# Patient Record
Sex: MALE | Race: BLACK OR AFRICAN AMERICAN | Employment: FULL TIME | URBAN - METROPOLITAN AREA
[De-identification: names, ages, dates, MRNs, and addresses within clinical notes are randomized per-mention and may not be internally consistent; named-entity substitution may affect disease eponyms.]

---

## 2019-01-31 ENCOUNTER — HOSPITAL ENCOUNTER (OUTPATIENT)
Dept: MRI IMAGING | Age: 39
Discharge: HOME | End: 2019-01-31
Attending: PODIATRIST
Payer: OTHER GOVERNMENT

## 2019-01-31 VITALS — WEIGHT: 210 LBS

## 2019-01-31 DIAGNOSIS — M76.821 TIBIALIS POSTERIOR TENDINITIS, RIGHT: ICD-10-CM

## 2019-01-31 PROCEDURE — 73723 MRI JOINT LWR EXTR W/O&W/DYE: CPT

## 2019-01-31 PROCEDURE — 74011250636 HC RX REV CODE- 250/636: Performed by: RADIOLOGY

## 2019-01-31 PROCEDURE — A9575 INJ GADOTERATE MEGLUMI 0.1ML: HCPCS | Performed by: RADIOLOGY

## 2019-01-31 RX ORDER — GADOTERATE MEGLUMINE 376.9 MG/ML
19 INJECTION INTRAVENOUS
Status: COMPLETED | OUTPATIENT
Start: 2019-01-31 | End: 2019-01-31

## 2019-01-31 RX ADMIN — GADOTERATE MEGLUMINE 19 ML: 376.9 INJECTION INTRAVENOUS at 18:35

## 2021-12-28 ENCOUNTER — OFFICE VISIT (OUTPATIENT)
Dept: ORTHOPEDIC SURGERY | Age: 41
End: 2021-12-28
Payer: OTHER GOVERNMENT

## 2021-12-28 VITALS — BODY MASS INDEX: 29.8 KG/M2 | HEIGHT: 72 IN | WEIGHT: 220 LBS

## 2021-12-28 DIAGNOSIS — S63.641A RUPTURE OF RADIAL COLLATERAL LIGAMENT OF THUMB, RIGHT, INITIAL ENCOUNTER: ICD-10-CM

## 2021-12-28 DIAGNOSIS — M79.641 RIGHT HAND PAIN: Primary | ICD-10-CM

## 2021-12-28 PROCEDURE — 99203 OFFICE O/P NEW LOW 30 MIN: CPT | Performed by: ORTHOPAEDIC SURGERY

## 2021-12-28 RX ORDER — DICLOFENAC SODIUM 75 MG/1
TABLET, DELAYED RELEASE ORAL
COMMUNITY
Start: 2021-11-12

## 2021-12-28 RX ORDER — ZOLPIDEM TARTRATE 10 MG/1
TABLET ORAL
COMMUNITY
Start: 2021-12-08

## 2021-12-28 NOTE — PROGRESS NOTES
HPI: Hoang Servin (: 1980) is a 35 y.o. male, patient, here for evaluation of the following chief complaint(s):    Thumb Pain (right hand dominant. right thumb pain for \"a while\" previously seen at another facility 2 years ago where he was diagnosed with trigger thumb. has become worse over time. is becoming more difficult for thumb to unlock on its own when it locks)  Patient is seen today to evaluate his right thumb. The patient has complained of right thumb pain for about the last 2 years. He states it has worsened over time. He also recalls that more recently he felt a pop and increasing pain involving his right thumb. His thumb rests ulnarly deviated at the metacarpal phalangeal joint due to clinical loss of the radial collateral ligament. He denied any numbness or tingling and is seen for further treatment of his right thumb. Vitals:  Ht 6' (1.829 m)   Wt 220 lb (99.8 kg)   BMI 29.84 kg/m²    Body mass index is 29.84 kg/m². No Known Allergies    Current Outpatient Medications   Medication Sig    diclofenac EC (VOLTAREN) 75 mg EC tablet     zolpidem (AMBIEN) 10 mg tablet      No current facility-administered medications for this visit. History reviewed. No pertinent past medical history. History reviewed. No pertinent surgical history. History reviewed. No pertinent family history. Social History     Tobacco Use    Smoking status: Never Smoker    Smokeless tobacco: Never Used   Vaping Use    Vaping Use: Never used   Substance Use Topics    Alcohol use: Not Currently    Drug use: Never        Review of Systems           Physical Exam    The right thumb rests 25 degrees ulnarly deviated the metacarpal phalangeal joint. Stress testing shows angulation increased to 60 degrees and there is no clear evidence of any intact radial collateral ligament tissue. Otherwise no digital clicking or locking and nontender to A1 pulley.     Examination of the right wrist reveals no swelling, edema or warmth, no tenderness to palpation, no pain, normal strength and tone, normal range of motion, no crepitus, normal sensation, no instability or laxity and no known fractures or deformities. Examination of the left wrist reveals no swelling, edema or warmth, no tenderness to palpation, no pain, normal strength and tone, normal range of motion, no crepitus, normal sensation, no instability or laxity and no known fractures or deformities. Examination of the left hand reveals no tenderness to palpation, no pain, normal  strength, normal tone, normal range of motion, no crepitus, no instability or laxity, no known fractures or deformities and normal sensation. Imaging:    XR Results (most recent):  Results from Appointment encounter on 12/28/21    XR HAND RT MIN 3 V    Narrative  AP, lateral bleak x-ray of the right hand show good bone stock and no acute fracture but there is ulnar deviation angulation of the right thumb metacarpal phalangeal joint with soft tissue swelling radially and some radial prominence of the metacarpal head consistent with prior radial collateral ligament injury. ASSESSMENT/PLAN:  Below is the assessment and plan developed based on review of pertinent history, physical exam, labs, studies, and medications. 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 The joint itself did not appear to show arthritic change but there is some prominence radial in the metacarpal head suggesting some component of chronicity. I explained to him that he could undergo reconstruction versus a fusion. He would prefer to trial of the reconstruction and if that were to be less than successful he could in the future consider the fusion.   I reviewed risks that include but not limited to stiffness, pain, nerve or tendon damage, continued instability and the possible need for a later fusion. The current plan is to perform the reconstruction with the Arthrex internal brace. Arrangements can be made for this to be performed on an outpatient basis as soon as possible. 1. Right hand pain  -     XR HAND RT MIN 3 V; Future  2. Rupture of radial collateral ligament of thumb, right, initial encounter      Return for Follow-up 7 to 10 days after surgery. .    An electronic signature was used to authenticate this note.   -- Amy Young MD

## 2021-12-30 DIAGNOSIS — S63.641A RUPTURE OF RADIAL COLLATERAL LIGAMENT OF THUMB, RIGHT, INITIAL ENCOUNTER: Primary | ICD-10-CM

## 2022-01-24 ENCOUNTER — TELEPHONE (OUTPATIENT)
Dept: ORTHOPEDIC SURGERY | Age: 42
End: 2022-01-24

## 2022-01-24 NOTE — TELEPHONE ENCOUNTER
Patient called to inform us that he needs to postpone his upcoming surgery with Dr. Walter Broussard due to a family emergency. I advised patient that we would cancel his surgery for this coming Wednesday, and told him to call us back when he was ready to reschedule. I spoke with Edmund at [facility] to cancel his surgery.      NAVI Canales Dr.

## 2023-02-14 ENCOUNTER — OFFICE VISIT (OUTPATIENT)
Dept: UROLOGY | Age: 43
End: 2023-02-14
Payer: OTHER GOVERNMENT

## 2023-02-14 VITALS — BODY MASS INDEX: 25.9 KG/M2 | WEIGHT: 185 LBS | HEIGHT: 71 IN

## 2023-02-14 DIAGNOSIS — R79.89 LOW TESTOSTERONE IN MALE: ICD-10-CM

## 2023-02-14 DIAGNOSIS — N52.8 OTHER MALE ERECTILE DYSFUNCTION: ICD-10-CM

## 2023-02-14 PROCEDURE — 99244 OFF/OP CNSLTJ NEW/EST MOD 40: CPT | Performed by: UROLOGY

## 2023-02-14 RX ORDER — ZOLPIDEM TARTRATE 5 MG/1
TABLET ORAL
COMMUNITY
End: 2023-02-14

## 2023-02-14 RX ORDER — TADALAFIL 20 MG/1
20 TABLET ORAL AS NEEDED
Qty: 30 TABLET | Refills: 5 | Status: SHIPPED | OUTPATIENT
Start: 2023-02-14

## 2023-02-14 RX ORDER — BUSPIRONE HYDROCHLORIDE 10 MG/1
10 TABLET ORAL 2 TIMES DAILY
COMMUNITY
Start: 2023-02-02

## 2023-02-14 RX ORDER — CHOLECALCIFEROL (VITAMIN D3) 25 MCG
TABLET ORAL
COMMUNITY
Start: 2023-01-03

## 2023-02-14 RX ORDER — OMEPRAZOLE 20 MG/1
CAPSULE, DELAYED RELEASE ORAL
COMMUNITY
Start: 2023-01-03

## 2023-02-14 RX ORDER — LORAZEPAM 0.5 MG/1
TABLET ORAL
COMMUNITY
Start: 2023-02-06

## 2023-02-14 RX ORDER — TADALAFIL 20 MG/1
20 TABLET ORAL AS NEEDED
Qty: 30 TABLET | Refills: 5 | Status: SHIPPED | OUTPATIENT
Start: 2023-02-14 | End: 2023-02-14

## 2023-02-14 NOTE — LETTER
2/14/2023    Patient: Gallito Santillan   YOB: 1980   Date of Visit: 2/14/2023     Carmelina Browne MD  [address] [facility] [address]  Via Fax: [phone]    Dear Carmelina Browne MD,      Thank you for referring Mr. Gallito Santillan to [facility] for evaluation. My notes for this consultation are attached. If you have questions, please do not hesitate to call me. I look forward to following your patient along with you.       Sincerely,    Angelica Davis MD

## 2023-02-14 NOTE — ASSESSMENT & PLAN NOTE
He has erectile dysfunction. We will discuss testosterone placement therapy if persistently low. We discussed tadalafil. The patient was instructed on the dosing of the medication and reason for taking it. We discussed the common and serious risks. The patient is advised to be cautious of side effects with a new medication.

## 2023-02-14 NOTE — PROGRESS NOTES
HISTORY OF PRESENT ILLNESS  Romain Steel is a 38 y.o. male. Chief Complaint   Patient presents with    New Patient    Erectile Dysfunction    Hypogonadism     Past Medical History:  PMHx (including negatives):  has no past medical history on file. PSurgHx:  has no past surgical history on file. PSocHx:  reports that he has been smoking cigars and cigarettes. He uses smokeless tobacco. He reports current alcohol use of about 1.0 standard drink per week. He reports that he does not use drugs. Referred by Dr. Silviano Aguilar for low testosterone. He showed me copies of his labs. 1/11/23 testosterone was 258. PSA 0.4. He was put on an antidepressant Lexapro in December. He noted less libido, erectile function and other side effects. He stopped after 3 days. He thinks he is out of depression. He has persistent issues with anxiety. He had a panic attack in May. He thinks that it started then. He is on anxiety meds. He does not leave his house. He does [employer] but is on leave. He was in the ER last Friday for a panic attack. He lost 35 lbs this past year. He has not had had sexual function over 2-3 months. He is . He currently reports of stress. Chronic Conditions Addressed Today       1. Low testosterone in male     Current Assessment & Plan       He has low testosterone. We discussed rechecking his labs. If his labs are persistently low we discussed testosterone replacement therapy. We discussed various forms of treatment including oral, topical and intramuscular injection. He favors starting IM treatments to assess the effects. We discussed potential side effects and the need for long-term monitoring. Testosterone placement should help his libido and sexual function. It may also may help his mood          Relevant Orders     FSH AND LH     TESTOSTERONE, FREE & TOTAL    2.  Other male erectile dysfunction     Current Assessment & Plan       He has erectile dysfunction. We will discuss testosterone placement therapy if persistently low. We discussed tadalafil. The patient was instructed on the dosing of the medication and reason for taking it. We discussed the common and serious risks. The patient is advised to be cautious of side effects with a new medication. Relevant Orders     FSH AND LH     TESTOSTERONE, FREE & TOTAL            Review of Systems   Psychiatric/Behavioral:  Negative for depression and suicidal ideas. The patient is nervous/anxious. All other systems reviewed and are negative. Patient denies the symptoms of COVID-19 per routine screening guidelines. Physical Exam  Constitutional:       General: He is not in acute distress. Appearance: Normal appearance. HENT:      Head: Normocephalic and atraumatic. Eyes:      Extraocular Movements: Extraocular movements intact. Pupils: Pupils are equal, round, and reactive to light. Cardiovascular:      Rate and Rhythm: Normal rate and regular rhythm. Pulmonary:      Effort: Pulmonary effort is normal. No respiratory distress. Breath sounds: Normal breath sounds. No wheezing or rhonchi. Abdominal:      Tenderness: There is no abdominal tenderness. There is no right CVA tenderness, left CVA tenderness, guarding or rebound. Hernia: No hernia is present. Musculoskeletal:         General: Normal range of motion. Lymphadenopathy:      Cervical: No cervical adenopathy. Skin:     General: Skin is warm and dry. Neurological:      General: No focal deficit present. Mental Status: He is alert and oriented to person, place, and time. Psychiatric:         Mood and Affect: Mood normal.         Behavior: Behavior normal.       ASSESSMENT and PLAN  Diagnoses and all orders for this visit:    1. Low testosterone in male  Assessment & Plan:   He has low testosterone. We discussed rechecking his labs.   If his labs are persistently low we discussed testosterone replacement therapy. We discussed various forms of treatment including oral, topical and intramuscular injection. He favors starting IM treatments to assess the effects. We discussed potential side effects and the need for long-term monitoring. Testosterone placement should help his libido and sexual function. It may also may help his mood    Orders:  -     FSH AND LH  -     TESTOSTERONE, FREE & TOTAL    2. Other male erectile dysfunction  Assessment & Plan:   He has erectile dysfunction. We will discuss testosterone placement therapy if persistently low. We discussed tadalafil. The patient was instructed on the dosing of the medication and reason for taking it. We discussed the common and serious risks. The patient is advised to be cautious of side effects with a new medication. Orders:  -     FSH AND LH  -     TESTOSTERONE, FREE & TOTAL    Other orders  -     tadalafiL (CIALIS) 20 mg tablet; Take 1 Tablet by mouth as needed for Erectile Dysfunction. Follow-up and Dispositions    Return in about 8 weeks (around 4/11/2023). Shital Berry may have a reminder for a \"due or due soon\" health maintenance. The patient has been encouraged to contact their primary care provider for follow-up on this health maintenance or other necessary and/or routine health screening. Seun Birmingham MD     Please note that portions of this note were completed with Dragon dictation, the computer voice recognition software. Quite often unanticipated grammatical, syntax, homophones, and other interpretive errors are inadvertently transcribed by the computer software. Please disregard these errors and any other errors that may have escaped final proofreading. Thank you.

## 2023-02-14 NOTE — PROGRESS NOTES
Chief Complaint   Patient presents with    New Patient    Erectile Dysfunction    Hypogonadism     1. Have you been to the ER, urgent care clinic since your last visit? Hospitalized since your last visit? Yes Where: [facility]. 2. Have you seen or consulted any other health care providers outside of the [address] [place] [address] since your last visit? Include any pap smears or colon screening.  No  Visit Vitals  Ht 5' 11\" (1.803 m)   Wt 185 lb (83.9 kg)   BMI 25.80 kg/m²

## 2023-02-14 NOTE — ASSESSMENT & PLAN NOTE
He has low testosterone. We discussed rechecking his labs. If his labs are persistently low we discussed testosterone replacement therapy. We discussed various forms of treatment including oral, topical and intramuscular injection. He favors starting IM treatments to assess the effects. We discussed potential side effects and the need for long-term monitoring. Testosterone placement should help his libido and sexual function.   It may also may help his mood

## 2023-02-18 LAB
FSH SERPL-ACNC: 2.6 MIU/ML (ref 1.5–12.4)
LH SERPL-ACNC: 4.9 MIU/ML (ref 1.7–8.6)
TESTOST FREE SERPL-MCNC: 6.1 PG/ML (ref 6.8–21.5)
TESTOST SERPL-MCNC: 201 NG/DL (ref 264–916)

## 2023-03-01 ENCOUNTER — VIRTUAL VISIT (OUTPATIENT)
Dept: UROLOGY | Age: 43
End: 2023-03-01
Payer: OTHER GOVERNMENT

## 2023-03-01 DIAGNOSIS — R79.89 LOW TESTOSTERONE IN MALE: Primary | ICD-10-CM

## 2023-03-01 DIAGNOSIS — F41.9 ANXIETY: ICD-10-CM

## 2023-03-01 PROCEDURE — 99214 OFFICE O/P EST MOD 30 MIN: CPT | Performed by: UROLOGY

## 2023-03-01 RX ORDER — PAROXETINE 10 MG/1
TABLET, FILM COATED ORAL DAILY
COMMUNITY

## 2023-03-01 NOTE — PROGRESS NOTES
Fiorella Mckeon, was evaluated through a synchronous (real-time) audio-video encounter. The patient (or guardian if applicable) is aware that this is a billable service, which includes applicable co-pays. This Virtual Visit was conducted with patient's (and/or legal guardian's) consent. The visit was conducted pursuant to the emergency declaration under the [address] [facility], [address] [place] [address] authority and the [employer] Act. Patient identification was verified, and a caregiver was present when appropriate. The patient was located in a state where the provider was licensed to provide care. HISTORY OF PRESENT ILLNESS  Fiorella Mckeon is a 38 y.o. male. has no past medical history on file. has no past surgical history on file. Chief Complaint   Patient presents with    Follow-up    Erectile Dysfunction    Hypogonadism         His mood is vastly improved on Paxil. He has less anxiety. He feels his erections are more spontaneous. 1/11/23 testosterone was 258. PSA 0.4. He was put on an antidepressant Lexapro in December. He noted less libido, erectile function and other side effects. He stopped after 3 days. He has persistent issues with anxiety. He had a panic attack in May. He thinks that it started then. He is on anxiety meds. He does not leave his house. He does [employer] but is on leave. He was in the ER last Friday for a panic attack. He lost 35 lbs this past year. He has not had had sexual function over 2-3 months. He is . He currently reports of stress. Chronic Conditions Addressed Today       1. Low testosterone in male - Primary     Current Assessment & Plan       We discussed placement therapy. The clinical parameters to treat include erectile dysfunction, depressed mood and low energy. He is doing little better with those things.   We discussed the risks including abnormal liver function, elevated blood counts, effect on prostate cancer and mood. We will wait another month and recheck his testosterone levels. We will make a decision to treat at that time. Relevant Orders     TESTOSTERONE, FREE & TOTAL    2. Anxiety     Current Assessment & Plan       He feels he is doing better on Paxil. I did advise that this may have an effect on his time to ejaculation. This may be a positive or negative thing. He will observe for these side effects. Relevant Medications     PARoxetine (PaxiL) 10 mg tablet     Component Ref Range & Units 2/14/23 1056    Testosterone 264 - 916 ng/dL 201 Low     Comment: Adult male reference interval is based on a population of   healthy nonobese males (BMI <30) between 23and 44years old. [address] [place] [address], [address] [facility] [id number]. PMID: 03761350. Free testosterone (Direct) 6.8 - 21.5 pg/mL 6.1 Low       Component Ref Range & Units 2/14/23 1056    Luteinizing hormone 1.7 - 8.6 mIU/mL 4.9    FSH 1.5 - 12.4 mIU/mL 2.6        Past Medical History:    PMHx (including negatives):  has no past medical history on file. PSurgHx:  has no past surgical history on file. PSocHx:  reports that he has been smoking cigars and cigarettes. He uses smokeless tobacco. He reports current alcohol use of about 1.0 standard drink per week. He reports that he does not use drugs. FamilyHX: No family history on file. ROS  Physical Exam  No Known Allergies  Current Outpatient Medications   Medication Sig Dispense Refill    PARoxetine (PaxiL) 10 mg tablet Take  by mouth daily. busPIRone (BUSPAR) 10 mg tablet Take 10 mg by mouth two (2) times a day. omeprazole (PRILOSEC) 20 mg capsule       Vitamin D3 25 mcg (1,000 unit) tablet       LORazepam (ATIVAN) 0.5 mg tablet TAKE 1 TABLET BY MOUTH DAILY AS NEEDED      tadalafiL (CIALIS) 20 mg tablet Take 1 Tablet by mouth as needed for Erectile Dysfunction.  30 Tablet 5    diclofenac EC (VOLTAREN) 75 mg EC tablet zolpidem (AMBIEN) 10 mg tablet         Results for orders placed or performed in visit on 02/14/23   [facility] AND LH   Result Value Ref Range    Luteinizing hormone 4.9 1.7 - 8.6 mIU/mL    FSH 2.6 1.5 - 12.4 mIU/mL   TESTOSTERONE, FREE & TOTAL   Result Value Ref Range    Testosterone 201 (L) 264 - 916 ng/dL    Free testosterone (Direct) 6.1 (L) 6.8 - 21.5 pg/mL       ASSESSMENT and PLAN  Diagnoses and all orders for this visit:    1. Low testosterone in male  Assessment & Plan:   We discussed placement therapy. The clinical parameters to treat include erectile dysfunction, depressed mood and low energy. He is doing little better with those things. We discussed the risks including abnormal liver function, elevated blood counts, effect on prostate cancer and mood. We will wait another month and recheck his testosterone levels. We will make a decision to treat at that time. Orders:  -     TESTOSTERONE, FREE & TOTAL; Future    2. Anxiety  Assessment & Plan:   He feels he is doing better on Paxil. I did advise that this may have an effect on his time to ejaculation. This may be a positive or negative thing. He will observe for these side effects. Follow-up and Dispositions    Return in about 5 weeks (around 4/5/2023). Balaji Ballesteros MD       Please note that portions of this note was potentially completed with Dragon dictation, the computer voice recognition software. Quite often unanticipated grammatical, syntax, homophones, and other interpretive errors are inadvertently transcribed by the computer software. Please disregard these errors. Please excuse any errors that have escaped final proofreading. Thank you.

## 2023-03-01 NOTE — ASSESSMENT & PLAN NOTE
He feels he is doing better on Paxil. I did advise that this may have an effect on his time to ejaculation. This may be a positive or negative thing. He will observe for these side effects. Patient Education        Learning About High Blood Sugar  What is high blood sugar? Your body turns the food you eat into glucose (sugar), which it uses for energy. But if your body isn't able to use the sugar right away, it can build up in your blood and lead to high blood sugar. When the amount of sugar in your blood stays too high for too much of the time, you may have diabetes. Diabetes is a disease that can cause serious health problems. The good news is that lifestyle changes may help you get your blood sugar back to normal and avoid or delay diabetes. What causes high blood sugar? Sugar (glucose) can build up in your blood if you:  · Are overweight. · Have a family history of diabetes. · Take certain medicines, such as steroids. What are the symptoms? Having high blood sugar may not cause any symptoms at all. Or it may make you feel very thirsty or very hungry. You may also urinate more often than usual, have blurry vision, or lose weight without trying. How is high blood sugar treated? You can take steps to lower your blood sugar level if you understand what makes it get higher. Your doctor may want you to learn how to test your blood sugar level at home. Then you can see how illness, stress, or different kinds of food or medicine raise or lower your blood sugar level. Other tests may be needed to see if you have diabetes. How can you prevent high blood sugar? · Watch your weight. If you're overweight, losing just a small amount of weight may help. Reducing fat around your waist is most important. · Limit the amount of calories, sweets, and unhealthy fat you eat. Ask your doctor if a dietitian can help you. A registered dietitian can help you create meal plans that fit your lifestyle. · Get at least 30 minutes of exercise on most days of the week. Exercise helps control your blood sugar. It also helps you maintain a healthy weight. Walking is a good choice.  You also may want to do other activities, such as running, swimming, cycling, or playing tennis or team sports. · If your doctor prescribed medicines, take them exactly as prescribed. Call your doctor if you think you are having a problem with your medicine. You will get more details on the specific medicines your doctor prescribes. Follow-up care is a key part of your treatment and safety. Be sure to make and go to all appointments, and call your doctor if you are having problems. It's also a good idea to know your test results and keep a list of the medicines you take. Where can you learn more? Go to http://jayantS.N. Safe&Softwaresuresh.info/. Enter O108 in the search box to learn more about \"Learning About High Blood Sugar. \"  Current as of: July 25, 2018  Content Version: 11.9  © 5503-2865 [employer]. Care instructions adapted under license by [employer] (which disclaims liability or warranty for this information). If you have questions about a medical condition or this instruction, always ask your healthcare professional. [place] [address] any warranty or liability for your use of this information. Patient Education        Abdominal Pain: Care Instructions  Your Care Instructions    Abdominal pain has many possible causes. Some aren't serious and get better on their own in a few days. Others need more testing and treatment. If your pain continues or gets worse, you need to be rechecked and may need more tests to find out what is wrong. You may need surgery to correct the problem. Don't ignore new symptoms, such as fever, nausea and vomiting, urination problems, pain that gets worse, and dizziness. These may be signs of a more serious problem. Your doctor may have recommended a follow-up visit in the next 8 to 12 hours. If you are not getting better, you may need more tests or treatment. The doctor has checked you carefully, but problems can develop later.  If you notice any problems or new symptoms, get medical treatment right away. Follow-up care is a key part of your treatment and safety. Be sure to make and go to all appointments, and call your doctor if you are having problems. It's also a good idea to know your test results and keep a list of the medicines you take. How can you care for yourself at home? · Rest until you feel better. · To prevent dehydration, drink plenty of fluids, enough so that your urine is light yellow or clear like water. Choose water and other caffeine-free clear liquids until you feel better. If you have kidney, heart, or liver disease and have to limit fluids, talk with your doctor before you increase the amount of fluids you drink. · If your stomach is upset, eat mild foods, such as rice, dry toast or crackers, bananas, and applesauce. Try eating several small meals instead of two or three large ones. · Wait until 48 hours after all symptoms have gone away before you have spicy foods, alcohol, and drinks that contain caffeine. · Do not eat foods that are high in fat. · Avoid anti-inflammatory medicines such as aspirin, ibuprofen (Advil, Motrin), and naproxen (Aleve). These can cause stomach upset. Talk to your doctor if you take daily aspirin for another health problem. When should you call for help? Call 911 anytime you think you may need emergency care. For example, call if:    · You passed out (lost consciousness).     · You pass maroon or very bloody stools.     · You vomit blood or what looks like coffee grounds.     · You have new, severe belly pain.    Call your doctor now or seek immediate medical care if:    · Your pain gets worse, especially if it becomes focused in one area of your belly.     · You have a new or higher fever.     · Your stools are black and look like tar, or they have streaks of blood.     · You have unexpected vaginal bleeding.     · You have symptoms of a urinary tract infection.  These may include:  ? Pain when you urinate. ? Urinating more often than usual.  ? Blood in your urine.     · You are dizzy or lightheaded, or you feel like you may faint.    Watch closely for changes in your health, and be sure to contact your doctor if:    · You are not getting better after 1 day (24 hours). Where can you learn more? Go to http://jayant-suresh.info/. Enter K587 in the search box to learn more about \"Abdominal Pain: Care Instructions. \"  Current as of: September 23, 2018  Content Version: 11.9  © 3174-2479 [employer]. Care instructions adapted under license by [employer] (which disclaims liability or warranty for this information). If you have questions about a medical condition or this instruction, always ask your healthcare professional. [address] any warranty or liability for your use of this information.

## 2023-03-01 NOTE — ASSESSMENT & PLAN NOTE
We discussed placement therapy. The clinical parameters to treat include erectile dysfunction, depressed mood and low energy. He is doing little better with those things. We discussed the risks including abnormal liver function, elevated blood counts, effect on prostate cancer and mood. We will wait another month and recheck his testosterone levels. We will make a decision to treat at that time.

## 2023-03-03 DIAGNOSIS — R79.89 LOW TESTOSTERONE IN MALE: Primary | ICD-10-CM

## 2023-03-03 RX ORDER — TESTOSTERONE CYPIONATE 200 MG/ML
200 INJECTION, SOLUTION INTRAMUSCULAR EVERY 2 WEEKS
Qty: 2 ML | Refills: 5 | Status: SHIPPED | OUTPATIENT
Start: 2023-03-03 | End: 2026-08-01

## 2023-03-08 ENCOUNTER — PATIENT MESSAGE (OUTPATIENT)
Dept: UROLOGY | Age: 43
End: 2023-03-08

## 2023-03-08 NOTE — TELEPHONE ENCOUNTER
From: Deja Larson  To: Calvin Steel MD  Sent: 3/8/2023 2:00 PM EST  Subject: Testosterone Prescription     Hello,    [facility] pharmacy hasnt received the pre-authorization form for the testosterone prescription. I have an appointment Friday, 10 March. Please let me know if this will be resolved before then, if not I will cancel the appointment. Thanks!

## 2023-03-10 ENCOUNTER — OFFICE VISIT (OUTPATIENT)
Dept: UROLOGY | Age: 43
End: 2023-03-10
Payer: OTHER GOVERNMENT

## 2023-03-10 DIAGNOSIS — R79.89 LOW TESTOSTERONE IN MALE: Primary | ICD-10-CM

## 2023-03-10 PROCEDURE — 96372 THER/PROPH/DIAG INJ SC/IM: CPT | Performed by: NURSE PRACTITIONER

## 2023-03-10 RX ORDER — TESTOSTERONE CYPIONATE 200 MG/ML
200 INJECTION, SOLUTION INTRAMUSCULAR
Status: COMPLETED | OUTPATIENT
Start: 2023-03-10 | End: 2023-03-10

## 2023-03-10 RX ADMIN — TESTOSTERONE CYPIONATE 200 MG: 200 INJECTION, SOLUTION INTRAMUSCULAR at 15:27

## 2023-03-10 NOTE — ASSESSMENT & PLAN NOTE
He has a lot of anxiety around having to be on testosterone replacement life-long. We discussed all avenues for replacement, pros and cons. He had an opportunity to ask questions and think through things. Ultimately, he decided to go forward with the injection today. If he has any further questions or concerns, he can shoot me a message on [employer].

## 2023-03-10 NOTE — PROGRESS NOTES
HISTORY OF PRESENT ILLNESS  Marilee Galicia is a 38 y.o. male. Chief Complaint   Patient presents with    Injection         Past Medical History:  PMHx (including negatives):  has no past medical history on file. PSurgHx:  has no past surgical history on file. PSocHx:  reports that he has been smoking cigars and cigarettes. He uses smokeless tobacco. He reports current alcohol use of about 1.0 standard drink per week. He reports that he does not use drugs. He was seen by Dr. Natasha Roe on 3/1/23 in regards to low T levels, erectile dysfunction and low libido. They discussed replacement therapy including the clinical parameters to treat (erectile dysfunction, depressed mood and low energy). They also discussed the risks including abnormal liver function, elevated blood counts, effect on prostate cancer and mood. Originally, he wished to wait and reconsider TRT in a month. However, he has decided after careful contemplation that he wishes to pursue therapy. He should follow up in 6 weeks with labs prior if he continues therapy. INJECTION, RIGHT DELTOID    Chronic Conditions Addressed Today       1. Low testosterone in male - Primary     Overview      He has ED and low libido. He was counseled on risks vs benefit. He has decided to pursue therapy and was started on IM injections on 3/10/23. Current Assessment & Plan      He has a lot of anxiety around having to be on testosterone replacement life-long. We discussed all avenues for replacement, pros and cons. He had an opportunity to ask questions and think through things. Ultimately, he decided to go forward with the injection today. If he has any further questions or concerns, he can shoot me a message on [employer]. Relevant Medications     testosterone cypionate (DEPOTESTOTERONE CYPIONATE) injection 200 mg (Start on 3/10/2023  4:00 PM)            ASSESSMENT and PLAN  Diagnoses and all orders for this visit:    1.  Low testosterone in male  Assessment & Plan:  He has a lot of anxiety around having to be on testosterone replacement life-long. We discussed all avenues for replacement, pros and cons. He had an opportunity to ask questions and think through things. Ultimately, he decided to go forward with the injection today. If he has any further questions or concerns, he can shoot me a message on [employer]. Orders:  -     testosterone cypionate (DEPOTESTOTERONE CYPIONATE) injection 200 mg         Follow-up and Dispositions    Return in about 6 weeks (around 4/21/2023) for labs with Dr. Constanza Lee; please reschedule for 6 weeks from today. Alejandrina Jeraldsandie may have a reminder for a \"due or due soon\" health maintenance. The patient has been encouraged to contact their primary care provider for follow-up on this health maintenance or other necessary and/or routine health screening. Please note that portions of this note were completed with Dragon dictation, the computer voice recognition software. Quite often unanticipated grammatical, syntax, homophones, and other interpretive errors are inadvertently transcribed by the computer software. Please disregard these errors and any other errors that may have escaped final proofreading. Thank you.

## 2023-03-21 ENCOUNTER — OFFICE VISIT (OUTPATIENT)
Dept: UROLOGY | Age: 43
End: 2023-03-21

## 2023-03-21 DIAGNOSIS — R79.89 LOW TESTOSTERONE IN MALE: Primary | ICD-10-CM

## 2023-03-21 NOTE — PATIENT INSTRUCTIONS
TESTOSTERONE REPLACEMENT LABS AND TIMING:     You are due for your routine lab work and monitoring in relation to your testosterone replacement therapy on 4/17/23 to 4/19/23. This is when you should have your labs drawn. After your labs are done, you will need to see Dr. Kathryn Marie to touch base. If you have not scheduled this visit, please do so. Yours is currently scheduled for 4/25/23. You will need to have your labs drawn 1-2 days BEFORE your next injection for accurate levels (joseph) so that we may determine appropriate dosing. This is 4/17/23 through 4/19/23. Please call the office and speak with the nurses if you have any questions. [phone].

## 2023-03-21 NOTE — PROGRESS NOTES
HISTORY OF PRESENT ILLNESS  Arline Shaffer is a 38 y.o. male. Chief Complaint   Patient presents with    Injection       Past Medical History:  PMHx (including negatives):  has no past medical history on file. PSurgHx:  has no past surgical history on file. PSocHx:  reports that he has been smoking cigars and cigarettes. He uses smokeless tobacco. He reports current alcohol use of about 1.0 standard drink per week. He reports that he does not use drugs. Labs due between 4/17/23 and 4/19/23. Follow up as scheduled on 4/25/23 with Dr. Lynsey Villalta. He will perform self-injections. He is fully prepared. 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 He will let me know if he has any problems. Chronic Conditions Addressed Today       1. Low testosterone in male - Primary     Overview      He has ED and low libido. He was counseled on risks vs benefit. He has decided to pursue therapy and was started on IM injections on 3/10/23. Current Assessment & Plan      We discussed therapy, timeline of notable effects, when to have labs done and expected follow up and course of treatment. Most patients are anticipating tangible results immediately. We discussed the course of therapy and realistic expectations. We discussed the importance of routine follow up and compliance with lab work and appointments as testosterone is a controlled substance and needs monitoring. He understands when to have his la bwork done. Instructions on AVS as well. Relevant Orders     CBC WITH AUTOMATED DIFF     HEPATIC FUNCTION PANEL     PSA, DIAGNOSTIC (PROSTATE SPECIFIC AG)     TESTOSTERONE, FREE & TOTAL              ASSESSMENT and PLAN  Diagnoses and all orders for this visit:    1. Low testosterone in male  Assessment & Plan:  We discussed therapy, timeline of notable effects, when to have labs done and expected follow up and course of treatment.     Most patients are anticipating tangible results immediately. We discussed the course of therapy and realistic expectations. We discussed the importance of routine follow up and compliance with lab work and appointments as testosterone is a controlled substance and needs monitoring. He understands when to have his la bwork done. Instructions on AVS as well. Orders:  -     CBC WITH AUTOMATED DIFF; Future  -     HEPATIC FUNCTION PANEL; Future  -     PSA, DIAGNOSTIC (PROSTATE SPECIFIC AG); Future  -     TESTOSTERONE, FREE & TOTAL; Future         Follow-up and Dispositions    Return in about 6 weeks (around 5/1/2023) for previously scheduled; 4/25/23 with [facility]; labs between 4/17 and 4/19/23. Ralph Hargrove may have a reminder for a \"due or due soon\" health maintenance. The patient has been encouraged to contact their primary care provider for follow-up on this health maintenance or other necessary and/or routine health screening. Please note that portions of this note were completed with Dragon dictation, the computer voice recognition software. Quite often unanticipated grammatical, syntax, homophones, and other interpretive errors are inadvertently transcribed by the computer software. Please disregard these errors and any other errors that may have escaped final proofreading. Thank you.

## 2023-03-21 NOTE — ASSESSMENT & PLAN NOTE
We discussed therapy, timeline of notable effects, when to have labs done and expected follow up and course of treatment. Most patients are anticipating tangible results immediately. We discussed the course of therapy and realistic expectations. We discussed the importance of routine follow up and compliance with lab work and appointments as testosterone is a controlled substance and needs monitoring. He understands when to have his la bwork done. Instructions on AVS as well.

## 2023-03-24 ENCOUNTER — TELEPHONE (OUTPATIENT)
Dept: UROLOGY | Age: 43
End: 2023-03-24

## 2023-03-24 RX ORDER — ISOPROPYL ALCOHOL 70 ML/100ML
1 SWAB TOPICAL
Qty: 40 PAD | Refills: 5 | Status: SHIPPED | OUTPATIENT
Start: 2023-03-24

## 2023-03-24 RX ORDER — CONTAINER,EMPTY
1 EACH MISCELLANEOUS
Qty: 1 EACH | Refills: 5 | Status: SHIPPED | OUTPATIENT
Start: 2023-03-24

## 2023-03-24 RX ORDER — NEEDLES, SAFETY 18GX1"
1 NEEDLE, DISPOSABLE MISCELLANEOUS EVERY 2 WEEKS
Qty: 10 PEN NEEDLE | Refills: 5 | Status: SHIPPED | OUTPATIENT
Start: 2023-03-24

## 2023-03-24 RX ORDER — SYRINGE, DISPOSABLE, 3 ML
1 SYRINGE, EMPTY DISPOSABLE MISCELLANEOUS EVERY 2 WEEKS
Qty: 10 EACH | Refills: 5 | Status: SHIPPED | OUTPATIENT
Start: 2023-03-24

## 2023-03-24 RX ORDER — TESTOSTERONE CYPIONATE 200 MG/ML
200 INJECTION, SOLUTION INTRAMUSCULAR EVERY 2 WEEKS
Qty: 2 ML | Refills: 5 | Status: SHIPPED | OUTPATIENT
Start: 2023-03-24 | End: 2026-08-22

## 2023-03-24 RX ORDER — NEEDLES, DISPOSABLE 23GX1 1/2"
1 NEEDLE, DISPOSABLE MISCELLANEOUS EVERY 2 WEEKS
Qty: 10 EACH | Refills: 5 | Status: SHIPPED | OUTPATIENT
Start: 2023-03-24

## 2023-03-24 NOTE — TELEPHONE ENCOUNTER
Please let  Galo Hickman know that [facility] will not accept my e-scripts for testosterone supplies. I have sent them to the [facility] he has on file. If he wants to pick them up and take them to [facility] (they also do not accept our faxes), let me know and I will print them all.

## 2023-03-24 NOTE — TELEPHONE ENCOUNTER
Spoke with pt he states  he will  supplies at the [facility] you sent them to

## 2023-04-01 DIAGNOSIS — R79.89 LOW TESTOSTERONE IN MALE: ICD-10-CM

## 2023-04-23 DIAGNOSIS — N52.8 OTHER MALE ERECTILE DYSFUNCTION: Primary | ICD-10-CM

## 2023-04-23 DIAGNOSIS — R79.89 LOW TESTOSTERONE IN MALE: Primary | ICD-10-CM

## 2023-04-23 DIAGNOSIS — R79.89 LOW TESTOSTERONE IN MALE: ICD-10-CM

## 2023-04-24 DIAGNOSIS — R79.89 LOW TESTOSTERONE IN MALE: Primary | ICD-10-CM

## 2023-04-24 DIAGNOSIS — R79.89 LOW TESTOSTERONE IN MALE: ICD-10-CM

## 2023-04-25 ENCOUNTER — OFFICE VISIT (OUTPATIENT)
Dept: UROLOGY | Age: 43
End: 2023-04-25
Payer: OTHER GOVERNMENT

## 2023-04-25 VITALS
TEMPERATURE: 97.8 F | HEIGHT: 71 IN | HEART RATE: 72 BPM | SYSTOLIC BLOOD PRESSURE: 140 MMHG | DIASTOLIC BLOOD PRESSURE: 89 MMHG | BODY MASS INDEX: 28.28 KG/M2 | WEIGHT: 202 LBS | OXYGEN SATURATION: 98 %

## 2023-04-25 DIAGNOSIS — F41.9 ANXIETY: ICD-10-CM

## 2023-04-25 DIAGNOSIS — R79.89 LOW TESTOSTERONE IN MALE: Primary | ICD-10-CM

## 2023-04-25 DIAGNOSIS — N52.8 OTHER MALE ERECTILE DYSFUNCTION: ICD-10-CM

## 2023-04-25 PROCEDURE — 99214 OFFICE O/P EST MOD 30 MIN: CPT | Performed by: UROLOGY

## 2023-04-25 NOTE — PROGRESS NOTES
HISTORY OF PRESENT ILLNESS  Davi Manzo is a 38 y.o. male. has no past medical history on file. has no past surgical history on file. HPI he was started on testosterone placement therapy. Has had 3 to 4 injections. He is self administering intramuscularly in his shoulder/deltoid. He has no problems doing this. He has felt a significant improvement in his mood. He has less anxiety and more energy. He can function specially without Cialis. He feels more confident and normal.      Chronic Conditions Addressed Today       1. Low testosterone in male - Primary     Overview      He has ED and low libido. He was counseled on risks vs benefit. He has decided to pursue therapy and was started on IM injections on 3/10/23. Self-administering. Current Assessment & Plan      He is self administering 200mg IM q 2 weeks. Labs within goal 4/17/23. Symptomatically his mood is better, energy levels are good and his erectile function is better. Continue medication          Relevant Orders     METABOLIC PANEL, COMPREHENSIVE     CBC WITH AUTOMATED DIFF     TESTOSTERONE, FREE & TOTAL    2. Other male erectile dysfunction     Overview      ED, started on TRT and tadalafil. Current Assessment & Plan      He is doing well on TRT. He no longer uses tadalafil. He does not need it. Relevant Orders     METABOLIC PANEL, COMPREHENSIVE     CBC WITH AUTOMATED DIFF     TESTOSTERONE, FREE & TOTAL    3. Anxiety     Current Assessment & Plan      His anxiety is much better on testosterone placement therapy. He is off buspirone and Paxil. Relevant Orders     METABOLIC PANEL, COMPREHENSIVE     CBC WITH AUTOMATED DIFF     TESTOSTERONE, FREE & TOTAL       Past Medical History:    PMHx (including negatives):  has no past medical history on file. PSurgHx:  has no past surgical history on file. PSocHx:  reports that he has been smoking cigars and cigarettes.  He uses smokeless tobacco. He reports current alcohol use of about 1.0 standard drink per week. He reports that he does not use drugs. FamilyHX: No family history on file. ROS  Physical Exam  No Known Allergies  Current Outpatient Medications   Medication Sig Dispense Refill    Safety Needles (Easy Touch FlipLock Needle) 18 gauge x 1\" ndle 1 Each by Does Not Apply route Once every 2 weeks. 10 Pen Needle 5    Needle, Disp, 23 G (Easy Touch Hypodermic Needle) 23 gauge x 1 1/2\" ndle 1 Pen Needle by Does Not Apply route Once every 2 weeks. 10 Each 5    Syringe, Disposable, 3 mL syrg 1 Each by Does Not Apply route Once every 2 weeks. 10 Each 5    alcohol swabs (Alcohol Prep Pads) padm 1 Box by Apply Externally route every three (3) months. 40 Pad 5    Oral Medication Containers (BD Sharps ) misc 1 Each by Does Not Apply route every 6 months. 1 Each 5    testosterone cypionate (DEPOTESTOTERONE CYPIONATE) 200 mg/mL injection 1 mL by IntraMUSCular route Once every 2 weeks for 90 doses. Max Daily Amount: 200 mg. 2 mL 5    omeprazole (PRILOSEC) 20 mg capsule       Vitamin D3 25 mcg (1,000 unit) tablet       LORazepam (ATIVAN) 0.5 mg tablet TAKE 1 TABLET BY MOUTH DAILY AS NEEDED      tadalafiL (CIALIS) 20 mg tablet Take 1 Tablet by mouth as needed for Erectile Dysfunction. 30 Tablet 5    diclofenac EC (VOLTAREN) 75 mg EC tablet       zolpidem (AMBIEN) 10 mg tablet         Results for orders placed or performed in visit on 04/17/23   CBC WITH AUTOMATED DIFF   Result Value Ref Range    WBC 3.4 3.4 - 10.8 x10E3/uL    RBC 4.88 4.14 - 5.80 x10E6/uL    HGB 14.8 13.0 - 17.7 g/dL    HCT 43.9 37.5 - 51.0 %    MCV 90 79 - 97 fL    MCH 30.3 26.6 - 33.0 pg    MCHC 33.7 31.5 - 35.7 g/dL    RDW 13.4 11.6 - 15.4 %    PLATELET 847 921 - 474 x10E3/uL    NEUTROPHILS 34 Not Estab. %    Lymphocytes 50 Not Estab. %    MONOCYTES 11 Not Estab. %    EOSINOPHILS 4 Not Estab. %    BASOPHILS 1 Not Estab. %    ABS.  NEUTROPHILS 1.2 (L) 1.4 - 7.0 x10E3/uL    Abs Lymphocytes 1.7 0.7 - 3.1 x10E3/uL    ABS. MONOCYTES 0.4 0.1 - 0.9 x10E3/uL    ABS. EOSINOPHILS 0.2 0.0 - 0.4 x10E3/uL    ABS. BASOPHILS 0.0 0.0 - 0.2 x10E3/uL    IMMATURE GRANULOCYTES 0 Not Estab. %    ABS. IMM. GRANS. 0.0 0.0 - 0.1 x10E3/uL   HEPATIC FUNCTION PANEL   Result Value Ref Range    Protein, total 7.4 6.0 - 8.5 g/dL    Albumin 4.5 4.0 - 5.0 g/dL    Bilirubin, total 0.7 0.0 - 1.2 mg/dL    Bilirubin, direct 0.15 0.00 - 0.40 mg/dL    Alk. phosphatase 52 44 - 121 IU/L    AST (SGOT) 26 0 - 40 IU/L    ALT (SGPT) 21 0 - 44 IU/L   PSA, DIAGNOSTIC (PROSTATE SPECIFIC AG)   Result Value Ref Range    Prostate Specific Ag 0.5 0.0 - 4.0 ng/mL   TESTOSTERONE, FREE & TOTAL   Result Value Ref Range    Testosterone 553 264 - 916 ng/dL    Free testosterone (Direct) 19.2 6.8 - 21.5 pg/mL       ASSESSMENT and PLAN  Diagnoses and all orders for this visit:    1. Low testosterone in male  Assessment & Plan:  He is self administering 200mg IM q 2 weeks. Labs within goal 4/17/23. Symptomatically his mood is better, energy levels are good and his erectile function is better. Continue medication    Orders:  -     METABOLIC PANEL, COMPREHENSIVE; Future  -     CBC WITH AUTOMATED DIFF; Future  -     TESTOSTERONE, FREE & TOTAL; Future    2. Other male erectile dysfunction  Assessment & Plan:  He is doing well on TRT. He no longer uses tadalafil. He does not need it. Orders:  -     METABOLIC PANEL, COMPREHENSIVE; Future  -     CBC WITH AUTOMATED DIFF; Future  -     TESTOSTERONE, FREE & TOTAL; Future    3. Anxiety  Assessment & Plan:  His anxiety is much better on testosterone placement therapy. He is off buspirone and Paxil. Orders:  -     METABOLIC PANEL, COMPREHENSIVE; Future  -     CBC WITH AUTOMATED DIFF; Future  -     TESTOSTERONE, FREE & TOTAL; Future       Follow-up and Dispositions    Return in about 6 months (around 10/25/2023) for NP visit ok.        Ximena Coppola MD       Please note that portions of this note was potentially completed with Dragon dictation, the computer voice recognition software. Quite often unanticipated grammatical, syntax, homophones, and other interpretive errors are inadvertently transcribed by the computer software. Please disregard these errors. Please excuse any errors that have escaped final proofreading. Thank you.

## 2023-04-25 NOTE — PROGRESS NOTES
No chief complaint on file. 1. Have you been to the ER, urgent care clinic since your last visit? Hospitalized since your last visit? No    2. Have you seen or consulted any other health care providers outside of the [address] [place] [address] since your last visit? Include any pap smears or colon screening.  No  Visit Vitals  BP (!) 140/89 (BP 1 Location: Left arm, BP Patient Position: Sitting, BP Cuff Size: Adult)   Pulse 72   Temp 97.8 °F (36.6 °C)   Ht 5' 11\" (1.803 m)   Wt 202 lb (91.6 kg)   SpO2 98%   BMI 28.17 kg/m²

## 2023-04-25 NOTE — ASSESSMENT & PLAN NOTE
He is self administering 200mg IM q 2 weeks. Labs within goal 4/17/23. Symptomatically his mood is better, energy levels are good and his erectile function is better.   Continue medication

## 2023-05-03 DIAGNOSIS — R79.89 LOW TESTOSTERONE IN MALE: ICD-10-CM

## 2023-05-03 DIAGNOSIS — N52.8 OTHER MALE ERECTILE DYSFUNCTION: ICD-10-CM

## 2023-05-08 DIAGNOSIS — N52.8 OTHER MALE ERECTILE DYSFUNCTION: ICD-10-CM

## 2023-05-08 DIAGNOSIS — R79.89 LOW TESTOSTERONE IN MALE: Primary | ICD-10-CM

## 2023-05-08 DIAGNOSIS — F41.9 ANXIETY: ICD-10-CM

## 2023-09-18 DIAGNOSIS — R79.89 LOW TESTOSTERONE IN MALE: Primary | ICD-10-CM

## 2023-09-20 RX ORDER — TESTOSTERONE CYPIONATE 200 MG/ML
INJECTION, SOLUTION INTRAMUSCULAR
Qty: 6 ML | Refills: 1 | Status: SHIPPED | OUTPATIENT
Start: 2023-09-20 | End: 2023-12-19

## 2023-09-20 RX ORDER — TESTOSTERONE CYPIONATE 200 MG/ML
INJECTION, SOLUTION INTRAMUSCULAR
Qty: 2 ML | OUTPATIENT
Start: 2023-09-20

## 2023-10-25 DIAGNOSIS — N52.8 OTHER MALE ERECTILE DYSFUNCTION: ICD-10-CM

## 2023-10-25 DIAGNOSIS — F41.9 ANXIETY: ICD-10-CM

## 2023-10-25 DIAGNOSIS — R79.89 LOW TESTOSTERONE IN MALE: ICD-10-CM

## 2024-03-07 DIAGNOSIS — R79.89 LOW TESTOSTERONE IN MALE: ICD-10-CM

## 2024-03-07 RX ORDER — TESTOSTERONE CYPIONATE 200 MG/ML
INJECTION, SOLUTION INTRAMUSCULAR
Qty: 6 ML | Refills: 1 | OUTPATIENT
Start: 2024-03-07 | End: 2024-06-05

## 2024-03-07 RX ORDER — TESTOSTERONE CYPIONATE 200 MG/ML
INJECTION, SOLUTION INTRAMUSCULAR
Qty: 6 ML | OUTPATIENT
Start: 2024-03-07 | End: 2024-06-05

## 2024-03-08 DIAGNOSIS — R79.89 LOW TESTOSTERONE IN MALE: ICD-10-CM

## 2024-03-08 RX ORDER — TESTOSTERONE CYPIONATE 200 MG/ML
INJECTION, SOLUTION INTRAMUSCULAR
Qty: 6 ML | Refills: 0 | Status: SHIPPED | OUTPATIENT
Start: 2024-03-08 | End: 2024-04-30

## 2024-03-12 LAB
BASOPHILS # BLD AUTO: 0 X10E3/UL (ref 0–0.2)
BASOPHILS NFR BLD AUTO: 1 %
EOSINOPHIL # BLD AUTO: 0.1 X10E3/UL (ref 0–0.4)
EOSINOPHIL NFR BLD AUTO: 4 %
ERYTHROCYTE [DISTWIDTH] IN BLOOD BY AUTOMATED COUNT: 13.5 % (ref 11.6–15.4)
HCT VFR BLD AUTO: 46.5 % (ref 37.5–51)
HGB BLD-MCNC: 15.7 G/DL (ref 13–17.7)
IMM GRANULOCYTES # BLD AUTO: 0 X10E3/UL (ref 0–0.1)
IMM GRANULOCYTES NFR BLD AUTO: 0 %
LYMPHOCYTES # BLD AUTO: 1.2 X10E3/UL (ref 0.7–3.1)
LYMPHOCYTES NFR BLD AUTO: 41 %
MCH RBC QN AUTO: 30.9 PG (ref 26.6–33)
MCHC RBC AUTO-ENTMCNC: 33.8 G/DL (ref 31.5–35.7)
MCV RBC AUTO: 92 FL (ref 79–97)
MONOCYTES # BLD AUTO: 0.4 X10E3/UL (ref 0.1–0.9)
MONOCYTES NFR BLD AUTO: 13 %
NEUTROPHILS # BLD AUTO: 1.2 X10E3/UL (ref 1.4–7)
NEUTROPHILS NFR BLD AUTO: 41 %
PLATELET # BLD AUTO: 170 X10E3/UL (ref 150–450)
RBC # BLD AUTO: 5.08 X10E6/UL (ref 4.14–5.8)
WBC # BLD AUTO: 2.9 X10E3/UL (ref 3.4–10.8)

## 2024-03-14 LAB
TESTOST FREE SERPL-MCNC: 7.3 PG/ML (ref 6.8–21.5)
TESTOST SERPL-MCNC: 338 NG/DL (ref 264–916)

## 2024-03-15 ENCOUNTER — OFFICE VISIT (OUTPATIENT)
Age: 44
End: 2024-03-15
Payer: OTHER GOVERNMENT

## 2024-03-15 VITALS
OXYGEN SATURATION: 98 % | SYSTOLIC BLOOD PRESSURE: 124 MMHG | DIASTOLIC BLOOD PRESSURE: 84 MMHG | BODY MASS INDEX: 33.05 KG/M2 | RESPIRATION RATE: 14 BRPM | HEIGHT: 72 IN | WEIGHT: 244 LBS

## 2024-03-15 DIAGNOSIS — N52.8 OTHER MALE ERECTILE DYSFUNCTION: ICD-10-CM

## 2024-03-15 DIAGNOSIS — Z12.5 PROSTATE CANCER SCREENING: ICD-10-CM

## 2024-03-15 DIAGNOSIS — R79.89 LOW TESTOSTERONE IN MALE: Primary | ICD-10-CM

## 2024-03-15 PROCEDURE — 99214 OFFICE O/P EST MOD 30 MIN: CPT | Performed by: UROLOGY

## 2024-03-15 RX ORDER — TADALAFIL 20 MG/1
20 TABLET ORAL PRN
Qty: 30 TABLET | Refills: 3 | Status: SHIPPED | OUTPATIENT
Start: 2024-03-15

## 2024-03-15 NOTE — PROGRESS NOTES
Chief Complaint   Patient presents with    Follow-up     Testosterone      Medication Refill     1. Have you been to the ER, urgent care clinic since your last visit?  Hospitalized since your last visit?No    2. Have you seen or consulted any other health care providers outside of the [facility] since your last visit?  Include any pap smears or colon screening. No  /84 (Site: Right Upper Arm, Position: Sitting, Cuff Size: Large Adult)   Resp 14   Ht 1.829 m (6')   Wt 110.7 kg (244 lb)   SpO2 98%   BMI 33.09 kg/m²     
  FamilyHX: History reviewed. No pertinent family history.    Review of Systems    Physical Exam        ASSESSMENT and PLAN  1. Low testosterone in male  Assessment & Plan:   Stable testosterone levels.  Clinically improved with treatment.  Continue medication at 20 mg IM every 2 weeks.  Orders:  -     CBC with Auto Differential; Future  -     Comprehensive Metabolic Panel; Future  -     Testosterone, free, total; Future  -     PSA, Diagnostic; Future  2. Other male erectile dysfunction  Assessment & Plan:  Improved with testosterone placement therapy and tadalafil.  Continue medications as needed.  3. Prostate cancer screening  Assessment & Plan:  He is at risk on testosterone replacement therapy.  Continue intermittent screening while on testosterone therapy      Return in about 6 months (around 9/15/2024) for Labs prior.   Ronny Da Silva MD       Please note that portions of this note was potentially completed with Dragon dictation, the computer voice recognition software.  Quite often unanticipated grammatical, syntax, homophones, and other interpretive errors are inadvertently transcribed by the computer software.  Please disregard these errors.  Please excuse any errors that have escaped final proofreading.  Thank you.

## 2024-03-15 NOTE — ASSESSMENT & PLAN NOTE
Stable testosterone levels.  Clinically improved with treatment.  Continue medication at 20 mg IM every 2 weeks.

## 2024-03-15 NOTE — ASSESSMENT & PLAN NOTE
He is at risk on testosterone replacement therapy.  Continue intermittent screening while on testosterone therapy

## 2024-03-18 ENCOUNTER — ANESTHESIA EVENT (OUTPATIENT)
Facility: HOSPITAL | Age: 44
End: 2024-03-18
Payer: COMMERCIAL

## 2024-03-18 ENCOUNTER — ANESTHESIA (OUTPATIENT)
Facility: HOSPITAL | Age: 44
End: 2024-03-18
Payer: COMMERCIAL

## 2024-03-18 ENCOUNTER — HOSPITAL ENCOUNTER (OUTPATIENT)
Facility: HOSPITAL | Age: 44
Setting detail: OUTPATIENT SURGERY
Discharge: HOME | End: 2024-03-18
Attending: INTERNAL MEDICINE | Admitting: SPECIALIST
Payer: COMMERCIAL

## 2024-03-18 VITALS
HEART RATE: 89 BPM | SYSTOLIC BLOOD PRESSURE: 125 MMHG | RESPIRATION RATE: 21 BRPM | OXYGEN SATURATION: 92 % | WEIGHT: 243.39 LBS | BODY MASS INDEX: 32.97 KG/M2 | HEIGHT: 72 IN | TEMPERATURE: 97.5 F | DIASTOLIC BLOOD PRESSURE: 93 MMHG

## 2024-03-18 PROCEDURE — 7100000010 HC PHASE II RECOVERY - FIRST 15 MIN: Performed by: INTERNAL MEDICINE

## 2024-03-18 PROCEDURE — 3600007512: Performed by: INTERNAL MEDICINE

## 2024-03-18 PROCEDURE — 7100000011 HC PHASE II RECOVERY - ADDTL 15 MIN: Performed by: INTERNAL MEDICINE

## 2024-03-18 PROCEDURE — 88305 TISSUE EXAM BY PATHOLOGIST: CPT

## 2024-03-18 PROCEDURE — 6360000002 HC RX W HCPCS: Performed by: NURSE ANESTHETIST, CERTIFIED REGISTERED

## 2024-03-18 PROCEDURE — 3600007502: Performed by: INTERNAL MEDICINE

## 2024-03-18 PROCEDURE — 2720000010 HC SURG SUPPLY STERILE: Performed by: INTERNAL MEDICINE

## 2024-03-18 PROCEDURE — 2580000003 HC RX 258: Performed by: NURSE ANESTHETIST, CERTIFIED REGISTERED

## 2024-03-18 PROCEDURE — 2500000003 HC RX 250 WO HCPCS: Performed by: NURSE ANESTHETIST, CERTIFIED REGISTERED

## 2024-03-18 PROCEDURE — 3700000000 HC ANESTHESIA ATTENDED CARE: Performed by: INTERNAL MEDICINE

## 2024-03-18 PROCEDURE — 3700000001 HC ADD 15 MINUTES (ANESTHESIA): Performed by: INTERNAL MEDICINE

## 2024-03-18 PROCEDURE — 2709999900 HC NON-CHARGEABLE SUPPLY: Performed by: INTERNAL MEDICINE

## 2024-03-18 RX ORDER — SODIUM CHLORIDE 9 MG/ML
INJECTION, SOLUTION INTRAVENOUS PRN
Status: CANCELLED | OUTPATIENT
Start: 2024-03-18

## 2024-03-18 RX ORDER — SODIUM CHLORIDE 0.9 % (FLUSH) 0.9 %
5-40 SYRINGE (ML) INJECTION PRN
Status: CANCELLED | OUTPATIENT
Start: 2024-03-18

## 2024-03-18 RX ORDER — SODIUM CHLORIDE 0.9 % (FLUSH) 0.9 %
5-40 SYRINGE (ML) INJECTION EVERY 12 HOURS SCHEDULED
Status: CANCELLED | OUTPATIENT
Start: 2024-03-18

## 2024-03-18 RX ORDER — ONDANSETRON 2 MG/ML
4 INJECTION INTRAMUSCULAR; INTRAVENOUS EVERY 6 HOURS PRN
Status: CANCELLED | OUTPATIENT
Start: 2024-03-18

## 2024-03-18 RX ORDER — SODIUM CHLORIDE 0.9 % (FLUSH) 0.9 %
5-40 SYRINGE (ML) INJECTION EVERY 12 HOURS SCHEDULED
Status: DISCONTINUED | OUTPATIENT
Start: 2024-03-18 | End: 2024-03-18 | Stop reason: HOSPADM

## 2024-03-18 RX ORDER — PROPOFOL 10 MG/ML
INJECTION, EMULSION INTRAVENOUS PRN
Status: DISCONTINUED | OUTPATIENT
Start: 2024-03-18 | End: 2024-03-18 | Stop reason: SDUPTHER

## 2024-03-18 RX ORDER — ONDANSETRON 4 MG/1
4 TABLET, ORALLY DISINTEGRATING ORAL EVERY 8 HOURS PRN
Status: CANCELLED | OUTPATIENT
Start: 2024-03-18

## 2024-03-18 RX ORDER — SODIUM CHLORIDE 9 MG/ML
25 INJECTION, SOLUTION INTRAVENOUS PRN
Status: DISCONTINUED | OUTPATIENT
Start: 2024-03-18 | End: 2024-03-18 | Stop reason: HOSPADM

## 2024-03-18 RX ORDER — SODIUM CHLORIDE 0.9 % (FLUSH) 0.9 %
5-40 SYRINGE (ML) INJECTION PRN
Status: DISCONTINUED | OUTPATIENT
Start: 2024-03-18 | End: 2024-03-18 | Stop reason: HOSPADM

## 2024-03-18 RX ORDER — SODIUM CHLORIDE 9 MG/ML
INJECTION, SOLUTION INTRAVENOUS CONTINUOUS PRN
Status: DISCONTINUED | OUTPATIENT
Start: 2024-03-18 | End: 2024-03-18 | Stop reason: SDUPTHER

## 2024-03-18 RX ADMIN — PROPOFOL 50 MG: 10 INJECTION, EMULSION INTRAVENOUS at 09:30

## 2024-03-18 RX ADMIN — PROPOFOL 180 MCG/KG/MIN: 10 INJECTION, EMULSION INTRAVENOUS at 09:31

## 2024-03-18 RX ADMIN — LIDOCAINE HYDROCHLORIDE 80 MG: 20 INJECTION, SOLUTION INFILTRATION; PERINEURAL at 09:30

## 2024-03-18 RX ADMIN — SODIUM CHLORIDE: 9 INJECTION, SOLUTION INTRAVENOUS at 09:19

## 2024-03-18 ASSESSMENT — PAIN - FUNCTIONAL ASSESSMENT: PAIN_FUNCTIONAL_ASSESSMENT: 0-10

## 2024-03-18 ASSESSMENT — PAIN SCALES - GENERAL
PAINLEVEL_OUTOF10: 0

## 2024-03-18 ASSESSMENT — LIFESTYLE VARIABLES: SMOKING_STATUS: 1

## 2024-03-18 NOTE — OP NOTE
procedure well.    Impression:  Full colonoscopy including 10 cm terminal ileum intubation, normal exam with random colon biopsies obtained for diarrhea workup    Recommendations:   Resume all preprocedure medications and preprocedure diet today  Repeat colonoscopy in 10 years time for colon cancer screening purposes  We will contact you by patient later in 7-14 business days regarding pathology results.  Please contact our [facility] office with additional questions or concerns at [phone].    Thank you for entrusting me with this patient's care.  Please do not hesitate to contact me with any questions or if I can be of assistance with any of your other patients' GI needs.    Signed By: Mehrdad Baron MD                        March 18, 2024      Surgical assistant none.  Implants none unless specified.    Will contact you with biopsy results by patient letter in 7-14 business days when available  Please contact my office at [phone] for any questions or concerns.       Thank you for entrusting me with this patient's care.  Please do not hesitate to contact me with any questions or if I can be of assistance with any of your other patients' GI needs.  Signed By: Mehrdad Baron MD                        March 18, 2024     Surgical assistant none.  Implants none unless specified.

## 2024-03-18 NOTE — ANESTHESIA PRE PROCEDURE
Outside name: zolpidem (AMBIEN) 10 mg tablet 12/8/21   Automatic Reconciliation, Ar       Current medications:    No current facility-administered medications for this encounter.       Allergies:  No Known Allergies    Problem List:    Patient Active Problem List   Diagnosis Code    Low testosterone in male R79.89    Other male erectile dysfunction N52.8    Anxiety F41.9    Prostate cancer screening Z12.5       Past Medical History:        Diagnosis Date    Anxiety     Depression        Past Surgical History:        Procedure Laterality Date    TOE SURGERY         Social History:    Social History     Tobacco Use    Smoking status: Every Day    Smokeless tobacco: Current   Substance Use Topics    Alcohol use: Yes     Alcohol/week: 1.0 standard drink of alcohol                                Ready to quit: Not Answered  Counseling given: Not Answered      Vital Signs (Current): There were no vitals filed for this visit.                                           BP Readings from Last 3 Encounters:   03/15/24 124/84   04/25/23 (!) 140/89       NPO Status:                                                                                 BMI:   Wt Readings from Last 3 Encounters:   03/15/24 110.7 kg (244 lb)   06/23/23 95.3 kg (210 lb)   06/01/23 95.3 kg (210 lb)     There is no height or weight on file to calculate BMI.    CBC:   Lab Results   Component Value Date/Time    WBC 2.9 03/11/2024 01:30 PM    RBC 5.08 03/11/2024 01:30 PM    HGB 15.7 03/11/2024 01:30 PM    HCT 46.5 03/11/2024 01:30 PM    MCV 92 03/11/2024 01:30 PM    RDW 13.5 03/11/2024 01:30 PM     03/11/2024 01:30 PM       CMP:   Lab Results   Component Value Date/Time    PROT 7.4 04/17/2023 07:28 AM    BILITOT 0.7 04/17/2023 07:28 AM    ALKPHOS 52 04/17/2023 07:28 AM    AST 26 04/17/2023 07:28 AM    ALT 21 04/17/2023 07:28 AM       POC Tests: No results for input(s): \"POCGLU\", \"POCNA\", \"POCK\", \"POCCL\", \"POCBUN\", \"POCHEMO\", \"POCHCT\" in the last 72

## 2024-03-18 NOTE — PERIOP NOTE
Endoscopy discharge instructions have been reviewed and given to patient.  The patient verbalized understanding and acceptance of instructions.      Dr. HUFFMAN discussed with Friend procedure findings and next steps.

## 2024-03-18 NOTE — ANESTHESIA POSTPROCEDURE EVALUATION
Department of Anesthesiology  Postprocedure Note    Patient: Marquis Chirinos  MRN: [id number]  YOB: 1980  Date of evaluation: 3/18/2024    Procedure Summary       Date: 03/18/24 Room / Location: [place] [address] / [facility] ENDOSCOPY    Anesthesia Start: 0927 Anesthesia Stop: 0956    Procedures:       COLONOSCOPY (Lower GI Region)      ESOPHAGOGASTRODUODENOSCOPY (Upper GI Region)      DILATION, ESOPHAGUS (Upper GI Region)      COLONOSCOPY BIOPSY (Lower GI Region) Diagnosis:       Chronic diarrhea of unknown origin      Gastroesophageal reflux disease, unspecified whether esophagitis present      Abnormal liver function tests      (Chronic diarrhea of unknown origin [K52.9])      (Gastroesophageal reflux disease, unspecified whether esophagitis present [K21.9])      (Abnormal liver function tests [R79.89])    Surgeons: Mehrdad Baron MD Responsible Provider: Saji Hadley MD    Anesthesia Type: MAC ASA Status: 2            Anesthesia Type: MAC    Dean Phase I: Dean Score: 10    Dean Phase II: Dean Score: 10    Anesthesia Post Evaluation    No notable events documented.

## 2024-03-18 NOTE — DISCHARGE INSTRUCTIONS
.                       [facility]  [facility]  Mehrdad Huffman MD  [phone]      March 18, 2024    Marquis Chirinos  YOB: 1980    EGD and remainder COLONOSCOPY DISCHARGE INSTRUCTIONS    If there is redness at IV site you should apply warm compress to area.  If redness or soreness persist contact Dr. Huffman's or your primary care doctor.    There may be a slight amount of blood passed from the rectum.  Gaseous discomfort may develop, but walking, belching will help relieve this.  You may not operate a vehicle for 12 hours  You may not operate machinery or dangerous appliances for rest of today  You may not drink alcoholic beverages for 12 hours  Avoid making any critical decisions for 24 hours    DIET:  You may resume your normal diet, but some patients find that heavy or large meals may lead to indigestion or vomiting.  I suggest a light meal as first food intake.    MEDICATIONS:  The use of some over-the-counter pain medication may lead to bleeding after colon biopsies or polyp removal.  Tylenol (also called acetaminophen) is safe to take even if you have had colonoscopy with polyp removal.  Based on the procedure you had today you may safely take aspirin or aspirin-like products for the next ten (10) days.  Remember that Tylenol (also called acetaminophen) is safe to take after colonoscopy even if you have had biopsies or polyps removed.    ACTIVITY:  You may resume your normal household activities, but it is recommended that you spend the remainder of the day resting -  avoid any strenuous activity.    CALL DR. HUFFMAN'S OFFICE IF:  Increasing pain, nausea, vomiting  Abdominal distension (swelling)  Significant new or increased bleeding (oral or rectal)  Fever/Chills  Chest pain/shortness of breath                       Additional instructions:   EGD findings-   Linear erythema in the distal esophagus consistent with nonerosive gastroesophageal reflux

## 2024-04-14 PROBLEM — Z12.5 PROSTATE CANCER SCREENING: Status: RESOLVED | Noted: 2024-03-15 | Resolved: 2024-04-14

## 2024-05-24 ENCOUNTER — HOSPITAL ENCOUNTER (OUTPATIENT)
Facility: HOSPITAL | Age: 44
Discharge: HOME | End: 2024-05-24
Attending: INTERNAL MEDICINE
Payer: COMMERCIAL

## 2024-05-24 DIAGNOSIS — K52.9 CHRONIC DIARRHEA: ICD-10-CM

## 2024-05-24 DIAGNOSIS — Z12.11 SCREENING FOR COLON CANCER: ICD-10-CM

## 2024-05-24 DIAGNOSIS — K21.9 CHRONIC GASTROESOPHAGEAL REFLUX DISEASE: ICD-10-CM

## 2024-05-24 DIAGNOSIS — R79.89 LFTS ABNORMAL: ICD-10-CM

## 2024-05-24 PROCEDURE — 74183 MRI ABD W/O CNTR FLWD CNTR: CPT

## 2024-05-24 PROCEDURE — A9577 INJ MULTIHANCE: HCPCS | Performed by: INTERNAL MEDICINE

## 2024-05-24 PROCEDURE — 6360000004 HC RX CONTRAST MEDICATION: Performed by: INTERNAL MEDICINE

## 2024-05-24 RX ADMIN — GADOBENATE DIMEGLUMINE 20 ML: 529 INJECTION, SOLUTION INTRAVENOUS at 12:21

## 2024-08-05 DIAGNOSIS — R79.89 LOW TESTOSTERONE IN MALE: ICD-10-CM

## 2024-08-07 RX ORDER — TESTOSTERONE CYPIONATE 200 MG/ML
INJECTION, SOLUTION INTRAMUSCULAR
Qty: 6 ML | Refills: 0 | Status: SHIPPED | OUTPATIENT
Start: 2024-08-07 | End: 2024-09-27

## 2024-09-02 DIAGNOSIS — R79.89 LOW TESTOSTERONE IN MALE: ICD-10-CM

## 2024-09-10 LAB
ALBUMIN SERPL-MCNC: 4.6 G/DL (ref 4.1–5.1)
ALP SERPL-CCNC: 57 IU/L (ref 44–121)
ALT SERPL-CCNC: 23 IU/L (ref 0–44)
AST SERPL-CCNC: 27 IU/L (ref 0–40)
BASOPHILS # BLD AUTO: 0 X10E3/UL (ref 0–0.2)
BASOPHILS NFR BLD AUTO: 0 %
BILIRUB SERPL-MCNC: 0.6 MG/DL (ref 0–1.2)
BUN SERPL-MCNC: 13 MG/DL (ref 6–24)
BUN/CREAT SERPL: 10 (ref 9–20)
CALCIUM SERPL-MCNC: 9.3 MG/DL (ref 8.7–10.2)
CHLORIDE SERPL-SCNC: 104 MMOL/L (ref 96–106)
CO2 SERPL-SCNC: 20 MMOL/L (ref 20–29)
CREAT SERPL-MCNC: 1.34 MG/DL (ref 0.76–1.27)
EGFRCR SERPLBLD CKD-EPI 2021: 66 ML/MIN/1.73
EOSINOPHIL # BLD AUTO: 0.1 X10E3/UL (ref 0–0.4)
EOSINOPHIL NFR BLD AUTO: 4 %
ERYTHROCYTE [DISTWIDTH] IN BLOOD BY AUTOMATED COUNT: 13.8 % (ref 11.6–15.4)
GLOBULIN SER CALC-MCNC: 2.9 G/DL (ref 1.5–4.5)
GLUCOSE SERPL-MCNC: 96 MG/DL (ref 70–99)
HCT VFR BLD AUTO: 48.2 % (ref 37.5–51)
HGB BLD-MCNC: 15.8 G/DL (ref 13–17.7)
IMM GRANULOCYTES # BLD AUTO: 0 X10E3/UL (ref 0–0.1)
IMM GRANULOCYTES NFR BLD AUTO: 0 %
LYMPHOCYTES # BLD AUTO: 1.6 X10E3/UL (ref 0.7–3.1)
LYMPHOCYTES NFR BLD AUTO: 43 %
MCH RBC QN AUTO: 31 PG (ref 26.6–33)
MCHC RBC AUTO-ENTMCNC: 32.8 G/DL (ref 31.5–35.7)
MCV RBC AUTO: 95 FL (ref 79–97)
MONOCYTES # BLD AUTO: 0.5 X10E3/UL (ref 0.1–0.9)
MONOCYTES NFR BLD AUTO: 13 %
NEUTROPHILS # BLD AUTO: 1.4 X10E3/UL (ref 1.4–7)
NEUTROPHILS NFR BLD AUTO: 40 %
PLATELET # BLD AUTO: 173 X10E3/UL (ref 150–450)
POTASSIUM SERPL-SCNC: 3.8 MMOL/L (ref 3.5–5.2)
PROT SERPL-MCNC: 7.5 G/DL (ref 6–8.5)
PSA SERPL-MCNC: 0.5 NG/ML (ref 0–4)
RBC # BLD AUTO: 5.09 X10E6/UL (ref 4.14–5.8)
SODIUM SERPL-SCNC: 140 MMOL/L (ref 134–144)
WBC # BLD AUTO: 3.6 X10E3/UL (ref 3.4–10.8)

## 2024-09-13 ENCOUNTER — TELEMEDICINE (OUTPATIENT)
Age: 44
End: 2024-09-13
Payer: COMMERCIAL

## 2024-09-13 DIAGNOSIS — R79.89 LOW TESTOSTERONE IN MALE: Primary | ICD-10-CM

## 2024-09-13 DIAGNOSIS — N52.8 OTHER MALE ERECTILE DYSFUNCTION: ICD-10-CM

## 2024-09-13 LAB
TESTOST FREE SERPL-MCNC: 10.6 PG/ML (ref 6.8–21.5)
TESTOST SERPL-MCNC: 457 NG/DL (ref 264–916)

## 2024-09-13 PROCEDURE — 99214 OFFICE O/P EST MOD 30 MIN: CPT | Performed by: UROLOGY

## 2024-09-13 RX ORDER — TADALAFIL 20 MG/1
20 TABLET ORAL PRN
Qty: 30 TABLET | Refills: 3 | Status: SHIPPED | OUTPATIENT
Start: 2024-09-13

## 2024-09-13 RX ORDER — NALTREXONE HYDROCHLORIDE 50 MG/1
50 TABLET, FILM COATED ORAL
COMMUNITY
Start: 2024-04-19

## 2024-09-13 RX ORDER — MIDAZOLAM HYDROCHLORIDE 1 MG/ML
INJECTION INTRAMUSCULAR; INTRAVENOUS
COMMUNITY
Start: 2020-10-19

## 2024-09-13 RX ORDER — HYDROXYZINE HYDROCHLORIDE 25 MG/1
25 TABLET, FILM COATED ORAL
COMMUNITY
Start: 2024-04-19

## 2024-09-13 RX ORDER — IBUPROFEN 800 MG/1
TABLET, FILM COATED ORAL
COMMUNITY

## 2024-09-13 RX ORDER — SUMATRIPTAN 50 MG/1
TABLET, FILM COATED ORAL
COMMUNITY

## 2024-09-13 RX ORDER — LOTEPREDNOL ETABONATE 5 MG/ML
SUSPENSION/ DROPS OPHTHALMIC
COMMUNITY

## 2024-09-13 RX ORDER — POLYETHYLENE GLYCOL-3350 AND ELECTROLYTES 236; 6.74; 5.86; 2.97; 22.74 G/274.31G; G/274.31G; G/274.31G; G/274.31G; G/274.31G
POWDER, FOR SOLUTION ORAL
COMMUNITY
Start: 2024-02-23

## 2024-09-13 RX ORDER — VARENICLINE TARTRATE 1 MG/1
1 TABLET, FILM COATED ORAL 2 TIMES DAILY
COMMUNITY

## 2024-11-18 DIAGNOSIS — R79.89 LOW TESTOSTERONE IN MALE: ICD-10-CM

## 2024-11-18 RX ORDER — TESTOSTERONE CYPIONATE 200 MG/ML
INJECTION, SOLUTION INTRAMUSCULAR
Qty: 10 ML | Refills: 0 | Status: SHIPPED | OUTPATIENT
Start: 2024-11-18 | End: 2025-02-28

## 2025-03-13 DIAGNOSIS — R79.89 LOW TESTOSTERONE IN MALE: ICD-10-CM

## 2025-04-12 LAB
ALBUMIN SERPL-MCNC: 4.3 G/DL (ref 4.1–5.1)
ALP SERPL-CCNC: 60 IU/L (ref 44–121)
ALT SERPL-CCNC: 38 IU/L (ref 0–44)
AST SERPL-CCNC: 39 IU/L (ref 0–40)
BILIRUB SERPL-MCNC: 0.5 MG/DL (ref 0–1.2)
BUN SERPL-MCNC: 14 MG/DL (ref 6–24)
BUN/CREAT SERPL: 12 (ref 9–20)
CALCIUM SERPL-MCNC: 9.1 MG/DL (ref 8.7–10.2)
CHLORIDE SERPL-SCNC: 103 MMOL/L (ref 96–106)
CO2 SERPL-SCNC: 20 MMOL/L (ref 20–29)
CREAT SERPL-MCNC: 1.21 MG/DL (ref 0.76–1.27)
EGFRCR SERPLBLD CKD-EPI 2021: 75 ML/MIN/1.73
GLOBULIN SER CALC-MCNC: 3.1 G/DL (ref 1.5–4.5)
GLUCOSE SERPL-MCNC: 69 MG/DL (ref 70–99)
POTASSIUM SERPL-SCNC: 4.1 MMOL/L (ref 3.5–5.2)
PROT SERPL-MCNC: 7.4 G/DL (ref 6–8.5)
PSA SERPL-MCNC: 0.3 NG/ML (ref 0–4)
SODIUM SERPL-SCNC: 140 MMOL/L (ref 134–144)
TESTOST SERPL-MCNC: 177 NG/DL (ref 264–916)

## 2025-04-14 LAB
BASOPHILS # BLD AUTO: 0 X10E3/UL (ref 0–0.2)
BASOPHILS NFR BLD AUTO: 1 %
EOSINOPHIL # BLD AUTO: 0.2 X10E3/UL (ref 0–0.4)
EOSINOPHIL NFR BLD AUTO: 6 %
ERYTHROCYTE [DISTWIDTH] IN BLOOD BY AUTOMATED COUNT: 13.1 % (ref 11.6–15.4)
HCT VFR BLD AUTO: 47.2 % (ref 37.5–51)
HGB BLD-MCNC: 15.8 G/DL (ref 13–17.7)
IMM GRANULOCYTES # BLD AUTO: 0 X10E3/UL (ref 0–0.1)
IMM GRANULOCYTES NFR BLD AUTO: 0 %
LYMPHOCYTES # BLD AUTO: 1.6 X10E3/UL (ref 0.7–3.1)
LYMPHOCYTES NFR BLD AUTO: 51 %
MCH RBC QN AUTO: 31 PG (ref 26.6–33)
MCHC RBC AUTO-ENTMCNC: 33.5 G/DL (ref 31.5–35.7)
MCV RBC AUTO: 93 FL (ref 79–97)
MONOCYTES # BLD AUTO: 0.3 X10E3/UL (ref 0.1–0.9)
MONOCYTES NFR BLD AUTO: 9 %
MORPHOLOGY BLD-IMP: ABNORMAL
NEUTROPHILS # BLD AUTO: 1 X10E3/UL (ref 1.4–7)
NEUTROPHILS NFR BLD AUTO: 33 %
PLATELET # BLD AUTO: 196 X10E3/UL (ref 150–450)
RBC # BLD AUTO: 5.09 X10E6/UL (ref 4.14–5.8)
WBC # BLD AUTO: 3.1 X10E3/UL (ref 3.4–10.8)

## 2025-04-29 DIAGNOSIS — R79.89 LOW TESTOSTERONE IN MALE: ICD-10-CM

## 2025-04-29 DIAGNOSIS — N52.8 OTHER MALE ERECTILE DYSFUNCTION: ICD-10-CM

## 2025-04-29 RX ORDER — TESTOSTERONE CYPIONATE 200 MG/ML
INJECTION, SOLUTION INTRAMUSCULAR
Qty: 5 ML | Refills: 0 | Status: SHIPPED | OUTPATIENT
Start: 2025-04-29 | End: 2025-08-09

## 2025-07-14 DIAGNOSIS — R79.89 LOW TESTOSTERONE IN MALE: ICD-10-CM

## 2025-07-24 ENCOUNTER — TELEMEDICINE (OUTPATIENT)
Age: 45
End: 2025-07-24
Payer: COMMERCIAL

## 2025-07-24 DIAGNOSIS — R79.89 LOW TESTOSTERONE IN MALE: ICD-10-CM

## 2025-07-24 DIAGNOSIS — N52.8 OTHER MALE ERECTILE DYSFUNCTION: ICD-10-CM

## 2025-07-24 DIAGNOSIS — N53.19 EJACULATORY DISORDER: Primary | ICD-10-CM

## 2025-07-24 DIAGNOSIS — F33.42 RECURRENT MAJOR DEPRESSIVE DISORDER, IN FULL REMISSION: ICD-10-CM

## 2025-07-24 PROCEDURE — 99214 OFFICE O/P EST MOD 30 MIN: CPT | Performed by: UROLOGY

## 2025-07-24 RX ORDER — TADALAFIL 20 MG/1
20 TABLET ORAL PRN
Qty: 30 TABLET | Refills: 3 | Status: SHIPPED | OUTPATIENT
Start: 2025-07-24

## 2025-07-24 RX ORDER — TESTOSTERONE CYPIONATE 200 MG/ML
INJECTION, SOLUTION INTRAMUSCULAR
Qty: 5 ML | Refills: 5 | Status: SHIPPED | OUTPATIENT
Start: 2025-07-24 | End: 2025-11-03

## 2025-07-24 RX ORDER — CARBOXYMETHYLCELLULOSE SODIUM 5 MG/ML
SOLUTION/ DROPS OPHTHALMIC
COMMUNITY

## 2025-07-24 NOTE — ASSESSMENT & PLAN NOTE
[facility]  He thinks he likes the way he feels on his medications.  He is on Paxil, buspirone, hydroxyzine, lorazepam.    He is bothered by difficulty achieving ejaculation.  This may be a side effect from his SSRI.  I advised he discuss this medication and alternatives with his prescribing provider at [facility].  I specifically advised him not to stop this medication cold without weaning off.

## 2025-07-24 NOTE — ASSESSMENT & PLAN NOTE
He has a worse time with achieving orgasm.  He can ejaculate once he gets to climax but it takes a prolonged time.  He follows in a marathon.  This sounds like an effect of an SSRI.  He can discuss weaning off of medication with his prescribing provider.

## 2025-07-24 NOTE — PROGRESS NOTES
Marquis Chirinos, was evaluated through a synchronous (real-time) audio-video encounter. The patient (or guardian if applicable) is aware that this is a billable service, which includes applicable co-pays. This Virtual Visit was conducted with patient's (and/or legal guardian's) consent. The visit was conducted pursuant to the emergency declaration under the [place] Act and the National Emergencies Act, 1135 waiver authority and the Coronavirus Preparedness and Response Supplemental Appropriations Act. Patient identification was verified, and a caregiver was present when appropriate. The patient was located in a state where the provider was licensed to provide care.     HISTORY OF PRESENT ILLNESS  Marquis Chirinos is a 45 y.o. male.   has a past medical history of Anxiety and Depression.  has a past surgical history that includes Toe Surgery; Colonoscopy (N/A, 3/18/2024); Upper gastrointestinal endoscopy (N/A, 3/18/2024); Esophagus dilation (3/18/2024); and Colonoscopy (N/A, 3/18/2024).  Chief Complaint   Patient presents with    Medication Refill     He was last seen September 13, 2024 for low testosterone and ED.  He is self injecting with testosterone 200 mg IM every 2 weeks.  He also uses tadalafil.  Labs from 4/11/2025 were reviewed.  Testosterone was 177.  PSA was 0.3.  CMP was within normal limits.  CBC showed a white count of 3.1, and his chronic range, H&H of 15.8 and 47.2 and platelets of 196.    He denies ED.  He has ejaculatory dysfunction.  He cannot get to climax.  He has been out of testosterone for 2 weeks.  He had a vasectomy in May and started having problems then.  He maybe reached climax 3x in 3 months.   He has been on Paxil for 1 year for depression.      Medication Refill        1. Ejaculatory disorder  Assessment & Plan:   He has a worse time with achieving orgasm.  He can ejaculate once he gets to climax but it takes a prolonged time.  He follows in a marathon.  This sounds like an effect of

## 2025-08-22 RX ORDER — TESTOSTERONE CYPIONATE 200 MG/ML
INJECTION, SOLUTION INTRAMUSCULAR
Qty: 10 ML | Refills: 1 | Status: SHIPPED | OUTPATIENT
Start: 2025-08-22 | End: 2026-01-30

## 2025-08-22 RX ORDER — TADALAFIL 20 MG/1
20 TABLET ORAL PRN
Qty: 30 TABLET | Refills: 1 | Status: SHIPPED | OUTPATIENT
Start: 2025-08-22

## (undated) DEVICE — BITEBLOCK ENDOSCP 60FR MAXI WHT POLYETH STURDY W/ VELC WVN

## (undated) DEVICE — 1200 GUARD II KIT W/5MM TUBE W/O VAC TUBE: Brand: GUARDIAN

## (undated) DEVICE — SYRINGE MED 5ML STD CLR PLAS LUERLOCK TIP N CTRL DISP

## (undated) DEVICE — SYRINGE MEDICAL 3ML CLEAR PLASTIC STANDARD NON CONTROL LUERLOCK TIP DISPOSABLE

## (undated) DEVICE — SET ADMIN 16ML TBNG L100IN 2 Y INJ SITE IV PIGGY BK DISP (ORDER IN MULIPLES OF 48)

## (undated) DEVICE — CATHETER IV 22GA L1IN OD0.8382-0.9144MM ID0.6096-0.6858MM 382523

## (undated) DEVICE — ELECTRODE,RADIOTRANSLUCENT,FOAM,3PK: Brand: MEDLINE

## (undated) DEVICE — SOLIDIFIER FLD 2OZ 1500CC N DISINF IN BTL DISP SAFESORB

## (undated) DEVICE — BLUNTFILL WITH FILTER: Brand: MONOJECT

## (undated) DEVICE — KIT COLON W/ 1.1OZ LUB AND 2 END

## (undated) DEVICE — BLUNTFILL: Brand: MONOJECT

## (undated) DEVICE — IV STRT KT 3282] LSL INDUSTRIES INC]

## (undated) DEVICE — CANNULA CUSH AD W/ 14FT TBG

## (undated) DEVICE — ESOPHAGEAL BALLOON DILATATION CATHETER: Brand: CRE FIXED WIRE